# Patient Record
Sex: MALE | Race: WHITE
[De-identification: names, ages, dates, MRNs, and addresses within clinical notes are randomized per-mention and may not be internally consistent; named-entity substitution may affect disease eponyms.]

---

## 2017-08-28 ENCOUNTER — HOSPITAL ENCOUNTER (EMERGENCY)
Dept: HOSPITAL 74 - JER | Age: 52
Discharge: NURSING, REHAB OR LONG-TERM CARE | End: 2017-08-28
Payer: COMMERCIAL

## 2017-08-28 VITALS — BODY MASS INDEX: 29.8 KG/M2

## 2017-08-28 VITALS — SYSTOLIC BLOOD PRESSURE: 133 MMHG | DIASTOLIC BLOOD PRESSURE: 100 MMHG | TEMPERATURE: 98.2 F | HEART RATE: 65 BPM
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
## 2017-08-28 DIAGNOSIS — E03.9: ICD-10-CM

## 2017-08-28 DIAGNOSIS — Z04.8: Primary | ICD-10-CM

## 2017-08-28 DIAGNOSIS — F78: ICD-10-CM

## 2017-08-28 DIAGNOSIS — G80.8: ICD-10-CM

## 2017-08-28 DIAGNOSIS — R05: ICD-10-CM
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
## 2017-08-28 NOTE — PDOC
History of Present Illness





- General


Stated Complaint: FALL (PCP SENT)


Time Seen by Provider: 08/28/17 10:53


History Source: Patient


Exam Limitations: No Limitations





- History of Present Illness


Initial Comments: 


08/28/17 11:16


CHIEF COMPLAINT: Fall





HISTORY OF PRESENT ILLNESS: This is a 51-year-old male with a history of CP, MR

, and hypothyroidism sent by his facility for evaluation following a fall from 

his bed. The patient is able to communicate using an electronic device, and 

states that he slid out of bed on his own has he wanted to use the bathroom and 

he did not want to wait for anyone to help him. He denies head trauma or any 

other injuries. He denies LOC. He denies all complaints.





V/s on arrival are notable for diastolic BP of 100.





REVIEW OF SYSTEMS: Collateral history obtained from aide at bedside


GENERAL/CONSTITUTIONAL: No fever or chills. No weakness. No weight change.


HEAD, EYES, EARS, NOSE AND THROAT: No change in vision. No ear pain or 

discharge. No sore throat.


CARDIOVASCULAR: No chest pain or palpitations.


RESPIRATORY: Persistent cough > 1wk.


GASTROINTESTINAL: No nausea, vomiting, diarrhea or constipation. 


GENITOURINARY: No dysuria, frequency, or change in urination.


MUSCULOSKELETAL: No joint or muscle swelling or pain. No neck or back pain.


SKIN: No rash or easy bruising.


NEUROLOGIC: No headache, vertigo, loss of consciousness, or loss of sensation.


PSYCHIATRIC: No depression or anxiety.


ENDOCRINE: No increased thirst. No abnormal weight change.


HEMATOLOGIC/LYMPHATIC: No anemia, easy bleeding, or history of blood clots.


ALLERGIC/IMMUNOLOGIC: No hives or skin allergy. No latex allergy.





PHYSICAL EXAM:


GENERAL: The patient is awake, alert, and fully oriented, in no acute distress.


HEAD: Normal with no signs of trauma. No cervical spinal tenderness.


ENT: Pupils equal, round and reactive to light, extraocular movements intact, 

sclera anicteric, conjunctiva clear. Neck supple.


LUNGS: Scattered ronchi. Productive cough.


CV: RRR, S1/S2, no MRG. Cap refill < 2 sec.


ABDOMEN: Soft, non-distended, non-tender.


EXTREMITIES: Normal range of motion, no edema.


NEUROLOGICAL: Minimally verbal, non-ambulatory at baseline. CN II-XII grossly 

intact.


PSYCH: Normal mood, normal affect.


SKIN: Warm, dry, normal turgor, no rashes or lesions noted.

















Past History





- Past Medical History


Allergies/Adverse Reactions: 


 Allergies











Allergy/AdvReac Type Severity Reaction Status Date / Time


 


No Known Allergies Allergy   Verified 08/28/17 10:39











Home Medications: 


Ambulatory Orders





Bisacodyl Suppository [Dulcolax Suppository -] 10 mg RC DAILY PRN 08/28/17 


Cetirizine HCl [All Day Allergy] 10 mg PO DAILY 08/28/17 


Chlorhexidine [Chlorhexidine Flavor] 30 ml PO BID 08/28/17 


Fluticasone Prop 0.05% Nasal [Flonase -] 1 - 2 spray NS DAILY 08/28/17 


Ibuprofen 400 mg PO DAILY 08/28/17 


Levothyroxine Sodium [Levo-T] 137 mcg PO DAILY 08/28/17 


Phenol/Glycerin [Chloraseptic Max Spray] 30 ml MM QID 08/28/17 


Trazodone HCl 100 mg PO HS 08/28/17 








Suicide Attempt (Hx): No


Thyroid Disease: Yes (HYPO)


Other medical history: mr cb





- Immunization History


Immunization Up to Date: Yes





- Psycho/Social/Smoking Cessation Hx


Anxiety: No


Suicidal Ideation: No


Smoking History: Never smoked


Have you smoked in the past 12 months: No


Information on smoking cessation initiated: No


Hx Alcohol Use: No


Drug/Substance Use Hx: No


Substance Use Type: None





*Physical Exam





- Vital Signs


 Last Vital Signs











Temp Pulse Resp BP Pulse Ox


 


 98.2 F   65   18   133/100   100 


 


 08/28/17 10:39  08/28/17 10:39  08/28/17 10:39  08/28/17 10:39  08/28/17 10:39














ED Treatment Course





- RADIOLOGY


Radiology Studies Ordered: 














 Category Date Time Status


 


 CHEST X-RAY PORTABLE* [RAD] Stat Radiology  08/28/17 11:05 Ordered














Medical Decision Making





- Medical Decision Making


08/28/17 12:36


A/P: 51 year old male s/p unwitnessed fall. Denies head injury or LOC.





-CXR (productive cough)











*DC/Admit/Observation/Transfer


Diagnosis at time of Disposition: 


 Cough





Fall


Qualifiers:


 Encounter type: initial encounter Qualified Code(s): W19.XXXA - Unspecified 

fall, initial encounter





- Discharge Dispostion


Disposition: HOME


Admit: No





- Patient Instructions


Printed Discharge Instructions:  How to Prevent Falls


Additional Instructions: 


Chace was seen for fall and was determined to have no injuries.


A chest xray was done for productive cough and was clear.


Please return for any concerning symptoms.

## 2018-06-02 ENCOUNTER — HOSPITAL ENCOUNTER (EMERGENCY)
Dept: HOSPITAL 74 - JER | Age: 53
LOS: 1 days | Discharge: HOME | End: 2018-06-03
Payer: COMMERCIAL

## 2018-06-02 VITALS — BODY MASS INDEX: 36.3 KG/M2
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
## 2018-06-02 DIAGNOSIS — G44.309: Primary | ICD-10-CM
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
## 2018-06-02 DIAGNOSIS — S00.01XA: ICD-10-CM
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
## 2018-06-02 DIAGNOSIS — G80.9: ICD-10-CM
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
## 2018-06-02 PROCEDURE — 3E0234Z INTRODUCTION OF SERUM, TOXOID AND VACCINE INTO MUSCLE, PERCUTANEOUS APPROACH: ICD-10-PCS | Performed by: EMERGENCY MEDICINE

## 2018-06-02 NOTE — PDOC
History of Present Illness





- General


Chief Complaint: Injury


Stated Complaint: FALL


Time Seen by Provider: 06/02/18 23:13





- History of Present Illness


Initial Comments: 





06/02/18 23:51


The patient is a 52 year old male with a history of cerebral palsy who presents 

for evaluation following a fall at his group home.  The patient is accompanied 

by his aid who assists in providing the history. They report that the patient 

had a mechanical fall today while in the bathroom with reported head trauma 

prompting the patient's presentation to the ED for further evaluation.  They 

deny LOC and the patient is currently asymptomatic and not complaining of 

headache.  ROS is limited due to the patient's cerebral palsy.





Past History





- Past Medical History


Allergies/Adverse Reactions: 


 Allergies











Allergy/AdvReac Type Severity Reaction Status Date / Time


 


No Known Allergies Allergy   Verified 06/02/18 21:15











COPD: No


Thyroid Disease: Yes


Other medical history: Cerebral Palsy





- Suicide/Smoking/Psychosocial Hx


Smoking History: Never smoked





**Review of Systems





- Review of Systems


Able to Perform ROS?: No (Cerebral Palsy)





*Physical Exam





- Vital Signs


 Last Vital Signs











Temp Pulse Resp BP Pulse Ox


 


 98.2 F   98 H  18   121/91   93 L


 


 06/02/18 21:12  06/02/18 21:12  06/02/18 21:12  06/02/18 21:12  06/02/18 21:12














- Physical Exam


Comments: 





06/02/18 23:59


General Appearance: Nourished. No Apparent Distress


HEENT: EOMI, IDALIA. Abrasions noted to the scalp. No Pharyngeal Erythema, 

Tonsillar Exudate, Tonsillar Erythema


Neck: No Cervical Lymphadenopathy or C-spine Tenderness.  Normal ROM


Respiratory/Chest: Lungs Clear, Normal Breath Sounds. No Crackles, Rales, 

Rhonchi, Wheezing


Cardiovascular: Regular Rhythm, Regular Rate. No  Murmur, Gallops, Rubs


Gastrointestinal/Abdominal: Normal Bowel Sounds, Soft. No Guarding, Rebound, 

Tenderness


Musculoskeletal: No CVA Tenderness


Extremity: Normal Capillary Refill


Integumentary: Normal Color, Dry, Warm


Neurologic:  Alert, Normal Mood/Affect, Normal Response for condition.  At 

Baseline Mentally.








Medical Decision Making





- Medical Decision Making





06/03/18 00:00


The patient is a 52 year old male with a history of cerebral palsy who presents 

for evaluation following a fall at his group home.  Differential includes but 

is not limited to: Intracranial process, Fracture, Contusion.  We obtain a head 

CT to evaluate for any intracranial process and it is unremarkable.  We updated 

the patient's tetanus here in the ED.  The patient appears clinically well on 

exam and is at his baseline mentally.  We are comfortable discharging the 

patient home at this time.  We discussed the results, plan, and return 

precautions with the patient's aid who voiced understanding and is agreeable 

with the plan.





*DC/Admit/Observation/Transfer


Diagnosis at time of Disposition: 


Fall


Qualifiers:


 Encounter type: initial encounter Qualified Code(s): W19.XXXA - Unspecified 

fall, initial encounter








- Discharge Dispostion


Disposition: HOME


Condition at time of disposition: Stable


Decision to Admit order: No





- Referrals





- Patient Instructions


Printed Discharge Instructions:  DI for Post-traumatic Headache, How to Prevent 

Falls


Additional Instructions: 


Please return to the ER if you experience concerning or worsening symptoms 

including worsening pain, headache, or vomiting.





Your CT scan was normal here in the ER.  It is important that you call to 

schedule a follow up appointment with your primary care provider within 2-3 

days to discuss your ER visit and further management of your symptoms.





- Post Discharge Activity

## 2018-06-03 VITALS — TEMPERATURE: 97.9 F | SYSTOLIC BLOOD PRESSURE: 115 MMHG | DIASTOLIC BLOOD PRESSURE: 72 MMHG | HEART RATE: 68 BPM

## 2018-06-03 NOTE — PDOC
Attending Attestation





- Medical Decision Making





06/03/18 00:18


EXAM: HEAD CT WITHOUT CONTRAST


HISTORY: Trauma


COMPARISON: None.


FINDINGS: The ventricular system is midline and nondilated. The sulcal pattern 

is normal for the


patient's age. There is no bleed, mass, extra-axial fluid collection or mass 

effect. No skull fracture


or skull lesion is identified. The visualized paranasal sinuses and mastoid air 

cells are clear.


IMPRESSION: Normal exam.





Read by: Dario Murphy MD





<Melany Montoya - Last Filed: 06/03/18 00:18>





- Resident


Resident Name: Albaro Christianson





- ED Attending Attestation


I have performed the following: I have examined & evaluated the patient, The 

case was reviewed & discussed with the resident, I agree w/resident's findings 

& plan





- HPI


HPI: 





06/03/18 22:37


Pt fell in the NH, and now has scratches to the left side of his head.


Pt has no complaints.  He is his usual self, affable and active.


Pt is MR and no verbal, but he has no complaints.





- Physicial Exam


PE: 





06/03/18 22:36


Agree with resident exam





Pt has no c spine tenderness








<Martine Garcia - Last Filed: 06/03/18 22:38>





Attestations





- Attestations





06/03/18 00:18





Documentation prepared by Melany Montoya, acting as medical scribe for 

Martine Garcia MD.





<Melany Montoya - Last Filed: 06/03/18 00:18>

## 2020-01-15 ENCOUNTER — HOSPITAL ENCOUNTER (EMERGENCY)
Dept: HOSPITAL 74 - JERFT | Age: 55
Discharge: HOME | End: 2020-01-15
Payer: COMMERCIAL

## 2020-01-15 VITALS — DIASTOLIC BLOOD PRESSURE: 61 MMHG | SYSTOLIC BLOOD PRESSURE: 118 MMHG | HEART RATE: 63 BPM | TEMPERATURE: 98.4 F

## 2020-01-15 VITALS — BODY MASS INDEX: 25.8 KG/M2

## 2020-01-15 DIAGNOSIS — J06.9: Primary | ICD-10-CM

## 2020-01-15 DIAGNOSIS — F79: ICD-10-CM

## 2020-01-15 DIAGNOSIS — M19.90: ICD-10-CM

## 2020-01-15 DIAGNOSIS — E03.9: ICD-10-CM

## 2020-01-15 DIAGNOSIS — G80.9: ICD-10-CM

## 2020-01-15 NOTE — EKG
Test Reason : 

Blood Pressure : ***/*** mmHG

Vent. Rate : 063 BPM     Atrial Rate : 063 BPM

   P-R Int : 154 ms          QRS Dur : 074 ms

    QT Int : 366 ms       P-R-T Axes : 025 018 024 degrees

   QTc Int : 374 ms

 

*** POOR DATA QUALITY, INTERPRETATION MAY BE ADVERSELY AFFECTED

NORMAL SINUS RHYTHM WITH SINUS ARRHYTHMIA

NORMAL ECG

WHEN COMPARED WITH ECG OF 29-MAY-2015 18:25,

T WAVE AMPLITUDE HAS DECREASED IN ANTERIOR LEADS

Confirmed by NEDRA PHILLIPS MD ([id number]) on 1/15/2020 3:34:09 PM

 

Referred By:             Confirmed By:NEDRA PHILLIPS MD

## 2020-01-15 NOTE — PDOC
History of Present Illness





- General


Chief Complaint: Cold Symptoms


Stated Complaint: COUGH/ FEVER


Time Seen by Provider: 01/15/20 12:23


History Source: Patient, Other (caretaker)





- History of Present Illness


Timing/Duration: reports: week





Past History





- Past Medical History


Allergies/Adverse Reactions: 


 Allergies











Allergy/AdvReac Type Severity Reaction Status Date / Time


 


No Known Allergies Allergy   Verified 08/28/17 10:39











Home Medications: 


Ambulatory Orders





Bisacodyl Suppository [Dulcolax Suppository -] 10 mg RC DAILY PRN 08/28/17 


Cetirizine HCl [All Day Allergy] 10 mg PO DAILY 08/28/17 


Chlorhexidine [Chlorhexidine Flavor] 30 ml PO BID 08/28/17 


Fluticasone Prop 0.05% Nasal [Flonase -] 1 - 2 spray NS DAILY 08/28/17 


Ibuprofen 400 mg PO DAILY 08/28/17 


Levothyroxine Sodium [Levo-T] 137 mcg PO DAILY 08/28/17 


Phenol/Glycerin [Chloraseptic Max Spray] 30 ml MM QID 08/28/17 


traZODone HCL [Trazodone HCl] 100 mg PO HS 08/28/17 








COPD: No


Thyroid Disease: Yes (HYPO)


Other medical history: cerebral palsy, MR, osteoarthritis, Low Vit D.





- Immunization History


Immunization Up to Date: Yes





- Psycho Social/Smoking Cessation Hx


Smoking History: Never smoked


Have you smoked in the past 12 months: No


Information on smoking cessation initiated: No


Hx Alcohol Use: No


Drug/Substance Use Hx: No


Substance Use Type: None





**Review of Systems





- Review of Systems


Constitutional: No: Fever


Respiratory: Yes: Cough.  No: Shortness of Breath, Wheezing





*Physical Exam





- Vital Signs


 Last Vital Signs











Temp Pulse Resp BP Pulse Ox


 


 98.4 F   63   18   118/61   97 


 


 01/15/20 12:09  01/15/20 12:09  01/15/20 12:09  01/15/20 12:09  01/15/20 12:09














- Physical Exam


General Appearance: Yes: Appropriately Dressed.  No: Apparent Distress


HEENT: positive: Normal Voice


Neck: positive: Supple.  negative: Lymphadenopathy (R), Lymphadenopathy (L)


Respiratory/Chest: positive: Lungs Clear, Normal Breath Sounds.  negative: 

Respiratory Distress


Cardiovascular: positive: Regular Rate, S1, S2


Integumentary: positive: Dry, Warm


Neurologic: positive: Alert





ED Treatment Course





- RADIOLOGY


Radiology Studies Ordered: 














 Category Date Time Status


 


 CHEST PA & LAT [RAD] Stat Radiology  01/15/20 12:44 Ordered














Medical Decision Making





- Medical Decision Making





01/15/20 13:16


54-year-old male, h/o CP, MR, wheelchair-bound, brought in by staff for mostly 

dry cough with pleuritic chest pain.  No hemoptysis, shortness of breath fever 

or chills





see exam





M/l viral URI


Exam unremarkable


EKG and CXR negative here


Dc to continue supportive tx











Discharge





- Discharge Information


Problems reviewed: Yes


Clinical Impression/Diagnosis: 


URI (upper respiratory infection)


Qualifiers:


 URI type: unspecified viral URI Qualified Code(s): J06.9 - Acute upper 

respiratory infection, unspecified





Condition: Stable


Disposition: HOME





- Follow up/Referral





- Patient Discharge Instructions


Patient Printed Discharge Instructions:  DI for Viral Upper Respiratory 

Infection -- Adult


Additional Instructions: 


EKG and chest x-ray are both normal here


The cause of patient's symptoms is most likely a virus.  Continue cough meds 

and maintain adequate hydration


Return to ER as needed





- Post Discharge Activity

## 2022-12-03 ENCOUNTER — HOSPITAL ENCOUNTER (EMERGENCY)
Dept: HOSPITAL 74 - JER | Age: 57
LOS: 1 days | Discharge: HOME | End: 2022-12-04
Payer: COMMERCIAL

## 2022-12-03 VITALS
DIASTOLIC BLOOD PRESSURE: 87 MMHG | RESPIRATION RATE: 20 BRPM | HEART RATE: 89 BPM | TEMPERATURE: 98 F | SYSTOLIC BLOOD PRESSURE: 127 MMHG

## 2022-12-03 VITALS — BODY MASS INDEX: 22.6 KG/M2
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
## 2022-12-03 DIAGNOSIS — S80.212A: Primary | ICD-10-CM

## 2023-07-26 ENCOUNTER — HOSPITAL ENCOUNTER (INPATIENT)
Dept: HOSPITAL 74 - JER | Age: 58
LOS: 5 days | Discharge: HOME | DRG: 389 | End: 2023-07-31
Attending: INTERNAL MEDICINE | Admitting: HOSPITALIST
Payer: COMMERCIAL

## 2023-07-26 VITALS — BODY MASS INDEX: 22.6 KG/M2

## 2023-07-26 DIAGNOSIS — E03.9: ICD-10-CM

## 2023-07-26 DIAGNOSIS — G80.9: ICD-10-CM

## 2023-07-26 DIAGNOSIS — F79: ICD-10-CM

## 2023-07-26 DIAGNOSIS — I31.9: ICD-10-CM

## 2023-07-26 DIAGNOSIS — E87.0: ICD-10-CM

## 2023-07-26 DIAGNOSIS — E87.6: ICD-10-CM

## 2023-07-26 DIAGNOSIS — K56.609: Primary | ICD-10-CM

## 2023-07-26 DIAGNOSIS — M19.231: ICD-10-CM

## 2023-07-27 LAB
ALBUMIN SERPL-MCNC: 3 G/DL (ref 3.4–5)
ALBUMIN SERPL-MCNC: 3.4 G/DL (ref 3.4–5)
ALP SERPL-CCNC: 103 U/L (ref 45–117)
ALP SERPL-CCNC: 123 U/L (ref 45–117)
ALT SERPL-CCNC: 21 U/L (ref 13–61)
ALT SERPL-CCNC: 24 U/L (ref 13–61)
ANION GAP SERPL CALC-SCNC: 7 MMOL/L (ref 8–16)
ANION GAP SERPL CALC-SCNC: 9 MMOL/L (ref 8–16)
APPEARANCE UR: CLEAR
APTT BLD: 25 SECONDS (ref 25.2–36.5)
AST SERPL-CCNC: 16 U/L (ref 15–37)
AST SERPL-CCNC: 22 U/L (ref 15–37)
BASOPHILS # BLD: 0.2 % (ref 0–2)
BILIRUB SERPL-MCNC: 0.2 MG/DL (ref 0.2–1)
BILIRUB SERPL-MCNC: 0.4 MG/DL (ref 0.2–1)
BILIRUB UR STRIP.AUTO-MCNC: NEGATIVE MG/DL
BUN SERPL-MCNC: 13.2 MG/DL (ref 7–18)
BUN SERPL-MCNC: 14.5 MG/DL (ref 7–18)
CALCIUM SERPL-MCNC: 8.8 MG/DL (ref 8.5–10.1)
CALCIUM SERPL-MCNC: 8.9 MG/DL (ref 8.5–10.1)
CHLORIDE SERPL-SCNC: 104 MMOL/L (ref 98–107)
CHLORIDE SERPL-SCNC: 109 MMOL/L (ref 98–107)
CO2 SERPL-SCNC: 26 MMOL/L (ref 21–32)
CO2 SERPL-SCNC: 27 MMOL/L (ref 21–32)
COLOR UR: YELLOW
CREAT SERPL-MCNC: 0.8 MG/DL (ref 0.55–1.3)
CREAT SERPL-MCNC: 0.9 MG/DL (ref 0.55–1.3)
DEPRECATED RDW RBC AUTO: 13 % (ref 11.9–15.9)
DEPRECATED RDW RBC AUTO: 13 % (ref 11.9–15.9)
EOSINOPHIL # BLD: 0.1 % (ref 0–4.5)
ERYTHROCYTE [SEDIMENTATION RATE] IN BLOOD BY WESTERGREN METHOD: 14 MM/HR (ref 0–20)
GLUCOSE SERPL-MCNC: 154 MG/DL (ref 74–106)
GLUCOSE SERPL-MCNC: 96 MG/DL (ref 74–106)
HCT VFR BLD CALC: 38.9 % (ref 35.4–49)
HCT VFR BLD CALC: 42.7 % (ref 35.4–49)
HGB BLD-MCNC: 13.2 GM/DL (ref 11.7–16.9)
HGB BLD-MCNC: 14.8 GM/DL (ref 11.7–16.9)
INR BLD: 1.12 (ref 0.83–1.09)
KETONES UR QL STRIP: (no result)
LACTATE SERPL-MCNC: 2.4 MMOL/L (ref 0.4–2)
LEUKOCYTE ESTERASE UR QL STRIP.AUTO: NEGATIVE
LIPASE SERPL-CCNC: 92 U/L (ref 73–393)
LYMPHOCYTES # BLD: 6 % (ref 8–40)
MAGNESIUM SERPL-MCNC: 2.1 MG/DL (ref 1.8–2.4)
MCH RBC QN AUTO: 28.9 PG (ref 25.7–33.7)
MCH RBC QN AUTO: 29.4 PG (ref 25.7–33.7)
MCHC RBC AUTO-ENTMCNC: 33.8 G/DL (ref 32–35.9)
MCHC RBC AUTO-ENTMCNC: 34.6 G/DL (ref 32–35.9)
MCV RBC: 84.9 FL (ref 80–96)
MCV RBC: 85.5 FL (ref 80–96)
MONOCYTES # BLD AUTO: 8.3 % (ref 3.8–10.2)
NEUTROPHILS # BLD: 85.4 % (ref 42.8–82.8)
NITRITE UR QL STRIP: NEGATIVE
PH UR: 6 [PH] (ref 5–8)
PHOSPHATE SERPL-MCNC: 3 MG/DL (ref 2.5–4.9)
PLATELET # BLD AUTO: 197 10^3/UL (ref 134–434)
PLATELET # BLD AUTO: 231 10^3/UL (ref 134–434)
PMV BLD: 8 FL (ref 7.5–11.1)
PMV BLD: 8.2 FL (ref 7.5–11.1)
POTASSIUM SERPLBLD-SCNC: 3.9 MMOL/L (ref 3.5–5.1)
POTASSIUM SERPLBLD-SCNC: 4.1 MMOL/L (ref 3.5–5.1)
PROT SERPL-MCNC: 6.4 G/DL (ref 6.4–8.2)
PROT SERPL-MCNC: 7.3 G/DL (ref 6.4–8.2)
PROT UR QL STRIP: (no result)
PROT UR QL STRIP: NEGATIVE
PT PNL PPP: 13 SEC (ref 9.7–13)
RBC # BLD AUTO: 4.55 M/MM3 (ref 4–5.6)
RBC # BLD AUTO: 5.03 M/MM3 (ref 4–5.6)
SODIUM SERPL-SCNC: 139 MMOL/L (ref 136–145)
SODIUM SERPL-SCNC: 142 MMOL/L (ref 136–145)
SP GR UR: 1.05 (ref 1.01–1.03)
UROBILINOGEN UR STRIP-MCNC: 0.2 MG/DL (ref 0.2–1)
WBC # BLD AUTO: 10.6 K/MM3 (ref 4–10)
WBC # BLD AUTO: 7.5 K/MM3 (ref 4–10)

## 2023-07-27 PROCEDURE — 0D9670Z DRAINAGE OF STOMACH WITH DRAINAGE DEVICE, VIA NATURAL OR ARTIFICIAL OPENING: ICD-10-PCS | Performed by: INTERNAL MEDICINE

## 2023-07-27 RX ADMIN — PANTOPRAZOLE SODIUM SCH MG: 40 INJECTION, POWDER, FOR SOLUTION INTRAVENOUS at 10:41

## 2023-07-27 RX ADMIN — IBUPROFEN SCH MG: 800 INJECTION INTRAVENOUS at 23:32

## 2023-07-27 RX ADMIN — DEXTROSE AND SODIUM CHLORIDE SCH MLS/HR: 5; 900 INJECTION, SOLUTION INTRAVENOUS at 20:35

## 2023-07-28 LAB
ALBUMIN SERPL-MCNC: 2.9 G/DL (ref 3.4–5)
ALP SERPL-CCNC: 92 U/L (ref 45–117)
ALT SERPL-CCNC: 23 U/L (ref 13–61)
ANION GAP SERPL CALC-SCNC: 5 MMOL/L (ref 8–16)
AST SERPL-CCNC: 31 U/L (ref 15–37)
BASOPHILS # BLD: 0.2 % (ref 0–2)
BILIRUB SERPL-MCNC: 0.2 MG/DL (ref 0.2–1)
BUN SERPL-MCNC: 11.1 MG/DL (ref 7–18)
CALCIUM SERPL-MCNC: 8.7 MG/DL (ref 8.5–10.1)
CHLORIDE SERPL-SCNC: 113 MMOL/L (ref 98–107)
CO2 SERPL-SCNC: 25 MMOL/L (ref 21–32)
CREAT SERPL-MCNC: 0.8 MG/DL (ref 0.55–1.3)
DEPRECATED RDW RBC AUTO: 13.2 % (ref 11.9–15.9)
EOSINOPHIL # BLD: 2.5 % (ref 0–4.5)
GLUCOSE SERPL-MCNC: 97 MG/DL (ref 74–106)
HCT VFR BLD CALC: 37.5 % (ref 35.4–49)
HGB BLD-MCNC: 12.9 GM/DL (ref 11.7–16.9)
LYMPHOCYTES # BLD: 24.6 % (ref 8–40)
MAGNESIUM SERPL-MCNC: 2 MG/DL (ref 1.8–2.4)
MCH RBC QN AUTO: 29.3 PG (ref 25.7–33.7)
MCHC RBC AUTO-ENTMCNC: 34.4 G/DL (ref 32–35.9)
MCV RBC: 85.1 FL (ref 80–96)
MONOCYTES # BLD AUTO: 12.6 % (ref 3.8–10.2)
NEUTROPHILS # BLD: 60.1 % (ref 42.8–82.8)
PLATELET # BLD AUTO: 207 10^3/UL (ref 134–434)
PMV BLD: 7.8 FL (ref 7.5–11.1)
POTASSIUM SERPLBLD-SCNC: 3.8 MMOL/L (ref 3.5–5.1)
PROT SERPL-MCNC: 6 G/DL (ref 6.4–8.2)
RBC # BLD AUTO: 4.41 M/MM3 (ref 4–5.6)
SODIUM SERPL-SCNC: 144 MMOL/L (ref 136–145)
WBC # BLD AUTO: 5.9 K/MM3 (ref 4–10)

## 2023-07-28 RX ADMIN — DEXTROSE AND SODIUM CHLORIDE SCH MLS/HR: 5; 900 INJECTION, SOLUTION INTRAVENOUS at 22:32

## 2023-07-28 RX ADMIN — DEXTROSE AND SODIUM CHLORIDE SCH MLS/HR: 5; 900 INJECTION, SOLUTION INTRAVENOUS at 07:38

## 2023-07-28 RX ADMIN — IBUPROFEN SCH MG: 800 INJECTION INTRAVENOUS at 15:58

## 2023-07-28 RX ADMIN — IBUPROFEN SCH MG: 800 INJECTION INTRAVENOUS at 22:27

## 2023-07-28 RX ADMIN — IBUPROFEN SCH MG: 800 INJECTION INTRAVENOUS at 06:13

## 2023-07-28 RX ADMIN — PANTOPRAZOLE SODIUM SCH MG: 40 INJECTION, POWDER, FOR SOLUTION INTRAVENOUS at 10:47

## 2023-07-29 LAB
ALBUMIN SERPL-MCNC: 2.8 G/DL (ref 3.4–5)
ALP SERPL-CCNC: 82 U/L (ref 45–117)
ALT SERPL-CCNC: 22 U/L (ref 13–61)
ANION GAP SERPL CALC-SCNC: 5 MMOL/L (ref 8–16)
AST SERPL-CCNC: 30 U/L (ref 15–37)
BASOPHILS # BLD: 0.4 % (ref 0–2)
BILIRUB SERPL-MCNC: 0.3 MG/DL (ref 0.2–1)
BUN SERPL-MCNC: 8.9 MG/DL (ref 7–18)
CALCIUM SERPL-MCNC: 8.3 MG/DL (ref 8.5–10.1)
CHLORIDE SERPL-SCNC: 117 MMOL/L (ref 98–107)
CO2 SERPL-SCNC: 26 MMOL/L (ref 21–32)
CREAT SERPL-MCNC: 0.9 MG/DL (ref 0.55–1.3)
DEPRECATED RDW RBC AUTO: 12.3 % (ref 11.9–15.9)
EOSINOPHIL # BLD: 4.6 % (ref 0–4.5)
GLUCOSE SERPL-MCNC: 111 MG/DL (ref 74–106)
HCT VFR BLD CALC: 35.7 % (ref 35.4–49)
HGB BLD-MCNC: 12.1 GM/DL (ref 11.7–16.9)
LYMPHOCYTES # BLD: 30.6 % (ref 8–40)
MAGNESIUM SERPL-MCNC: 1.9 MG/DL (ref 1.8–2.4)
MCH RBC QN AUTO: 29 PG (ref 25.7–33.7)
MCHC RBC AUTO-ENTMCNC: 33.9 G/DL (ref 32–35.9)
MCV RBC: 85.5 FL (ref 80–96)
MONOCYTES # BLD AUTO: 13 % (ref 3.8–10.2)
NEUTROPHILS # BLD: 51.4 % (ref 42.8–82.8)
PLATELET # BLD AUTO: 222 10^3/UL (ref 134–434)
PMV BLD: 8.3 FL (ref 7.5–11.1)
POTASSIUM SERPLBLD-SCNC: 3.5 MMOL/L (ref 3.5–5.1)
PROT SERPL-MCNC: 5.8 G/DL (ref 6.4–8.2)
RBC # BLD AUTO: 4.17 M/MM3 (ref 4–5.6)
SODIUM SERPL-SCNC: 148 MMOL/L (ref 136–145)
WBC # BLD AUTO: 4.6 K/MM3 (ref 4–10)

## 2023-07-29 RX ADMIN — PANTOPRAZOLE SODIUM SCH MG: 40 INJECTION, POWDER, FOR SOLUTION INTRAVENOUS at 09:13

## 2023-07-29 RX ADMIN — IBUPROFEN SCH MG: 800 INJECTION INTRAVENOUS at 22:23

## 2023-07-29 RX ADMIN — IBUPROFEN SCH MG: 800 INJECTION INTRAVENOUS at 15:10

## 2023-07-29 RX ADMIN — IBUPROFEN SCH MG: 800 INJECTION INTRAVENOUS at 06:35

## 2023-07-29 RX ADMIN — DEXTROSE AND SODIUM CHLORIDE SCH MLS/HR: 5; 900 INJECTION, SOLUTION INTRAVENOUS at 17:44

## 2023-07-30 LAB
ALBUMIN SERPL-MCNC: 3 G/DL (ref 3.4–5)
ALP SERPL-CCNC: 84 U/L (ref 45–117)
ALT SERPL-CCNC: 27 U/L (ref 13–61)
ANION GAP SERPL CALC-SCNC: 6 MMOL/L (ref 8–16)
ANION GAP SERPL CALC-SCNC: 6 MMOL/L (ref 8–16)
AST SERPL-CCNC: 30 U/L (ref 15–37)
BASOPHILS # BLD: 0.4 % (ref 0–2)
BILIRUB SERPL-MCNC: 0.3 MG/DL (ref 0.2–1)
BUN SERPL-MCNC: 5.2 MG/DL (ref 7–18)
BUN SERPL-MCNC: 7.3 MG/DL (ref 7–18)
CALCIUM SERPL-MCNC: 8.1 MG/DL (ref 8.5–10.1)
CALCIUM SERPL-MCNC: 8.4 MG/DL (ref 8.5–10.1)
CHLORIDE SERPL-SCNC: 115 MMOL/L (ref 98–107)
CHLORIDE SERPL-SCNC: 117 MMOL/L (ref 98–107)
CO2 SERPL-SCNC: 25 MMOL/L (ref 21–32)
CO2 SERPL-SCNC: 26 MMOL/L (ref 21–32)
CREAT SERPL-MCNC: 0.9 MG/DL (ref 0.55–1.3)
CREAT SERPL-MCNC: 0.9 MG/DL (ref 0.55–1.3)
DEPRECATED RDW RBC AUTO: 12.9 % (ref 11.9–15.9)
EOSINOPHIL # BLD: 4.9 % (ref 0–4.5)
GLUCOSE SERPL-MCNC: 100 MG/DL (ref 74–106)
GLUCOSE SERPL-MCNC: 87 MG/DL (ref 74–106)
HCT VFR BLD CALC: 36.2 % (ref 35.4–49)
HGB BLD-MCNC: 12.9 GM/DL (ref 11.7–16.9)
LYMPHOCYTES # BLD: 32.8 % (ref 8–40)
MCH RBC QN AUTO: 29.9 PG (ref 25.7–33.7)
MCHC RBC AUTO-ENTMCNC: 35.5 G/DL (ref 32–35.9)
MCV RBC: 84.1 FL (ref 80–96)
MONOCYTES # BLD AUTO: 9.6 % (ref 3.8–10.2)
NEUTROPHILS # BLD: 52.3 % (ref 42.8–82.8)
PLATELET # BLD AUTO: 233 10^3/UL (ref 134–434)
PMV BLD: 8.1 FL (ref 7.5–11.1)
POTASSIUM SERPLBLD-SCNC: 3.2 MMOL/L (ref 3.5–5.1)
POTASSIUM SERPLBLD-SCNC: 3.3 MMOL/L (ref 3.5–5.1)
PROT SERPL-MCNC: 6.3 G/DL (ref 6.4–8.2)
RBC # BLD AUTO: 4.31 M/MM3 (ref 4–5.6)
SODIUM SERPL-SCNC: 147 MMOL/L (ref 136–145)
SODIUM SERPL-SCNC: 149 MMOL/L (ref 136–145)
WBC # BLD AUTO: 5.7 K/MM3 (ref 4–10)

## 2023-07-30 RX ADMIN — POTASSIUM CHLORIDE SCH MLS/HR: 7.46 INJECTION, SOLUTION INTRAVENOUS at 13:16

## 2023-07-30 RX ADMIN — POTASSIUM CHLORIDE SCH MLS/HR: 7.46 INJECTION, SOLUTION INTRAVENOUS at 15:03

## 2023-07-30 RX ADMIN — IBUPROFEN SCH MG: 800 INJECTION INTRAVENOUS at 06:06

## 2023-07-30 RX ADMIN — PANTOPRAZOLE SODIUM SCH MG: 40 INJECTION, POWDER, FOR SOLUTION INTRAVENOUS at 08:59

## 2023-07-30 RX ADMIN — IBUPROFEN SCH MG: 800 INJECTION INTRAVENOUS at 17:44

## 2023-07-30 RX ADMIN — IBUPROFEN SCH MG: 800 INJECTION INTRAVENOUS at 22:19

## 2023-07-30 RX ADMIN — ENOXAPARIN SODIUM SCH MG: 40 INJECTION SUBCUTANEOUS at 10:15

## 2023-07-30 RX ADMIN — POTASSIUM CHLORIDE SCH MLS/HR: 7.46 INJECTION, SOLUTION INTRAVENOUS at 16:26

## 2023-07-31 VITALS
DIASTOLIC BLOOD PRESSURE: 65 MMHG | TEMPERATURE: 97.9 F | RESPIRATION RATE: 18 BRPM | HEART RATE: 74 BPM | SYSTOLIC BLOOD PRESSURE: 119 MMHG

## 2023-07-31 RX ADMIN — PANTOPRAZOLE SODIUM SCH MG: 40 INJECTION, POWDER, FOR SOLUTION INTRAVENOUS at 09:04

## 2023-07-31 RX ADMIN — IBUPROFEN SCH MG: 800 INJECTION INTRAVENOUS at 06:12

## 2023-07-31 RX ADMIN — IBUPROFEN SCH: 800 INJECTION INTRAVENOUS at 16:13

## 2023-07-31 RX ADMIN — ENOXAPARIN SODIUM SCH MG: 40 INJECTION SUBCUTANEOUS at 09:03

## 2024-01-07 ENCOUNTER — HOSPITAL ENCOUNTER (EMERGENCY)
Dept: HOSPITAL 74 - JER | Age: 59
Discharge: HOME | End: 2024-01-07
Payer: COMMERCIAL

## 2024-01-07 VITALS — HEART RATE: 114 BPM | RESPIRATION RATE: 20 BRPM | DIASTOLIC BLOOD PRESSURE: 63 MMHG | SYSTOLIC BLOOD PRESSURE: 106 MMHG

## 2024-01-07 VITALS — TEMPERATURE: 98.4 F

## 2024-01-07 VITALS — BODY MASS INDEX: 22.7 KG/M2

## 2024-01-07 DIAGNOSIS — U07.1: ICD-10-CM

## 2024-01-07 DIAGNOSIS — R00.0: Primary | ICD-10-CM

## 2024-01-07 DIAGNOSIS — R05.9: ICD-10-CM

## 2024-01-07 LAB
ALBUMIN SERPL-MCNC: 3.1 G/DL (ref 3.4–5)
ALP SERPL-CCNC: 114 U/L (ref 45–117)
ALT SERPL-CCNC: 18 U/L (ref 13–61)
ANION GAP SERPL CALC-SCNC: 6 MMOL/L (ref 4–13)
APTT BLD: 28.3 SECONDS (ref 25.2–36.5)
AST SERPL-CCNC: 11 U/L (ref 15–37)
BASE EXCESS BLDV CALC-SCNC: 4.1 MMOL/L (ref -2–2)
BASOPHILS # BLD: 0.5 % (ref 0–2)
BILIRUB SERPL-MCNC: 0.3 MG/DL (ref 0.2–1)
BUN SERPL-MCNC: 14 MG/DL (ref 7–18)
CALCIUM SERPL-MCNC: 8.5 MG/DL (ref 8.5–10.1)
CHLORIDE SERPL-SCNC: 106 MMOL/L (ref 98–107)
CO2 SERPL-SCNC: 29 MMOL/L (ref 21–32)
CREAT SERPL-MCNC: 1 MG/DL (ref 0.55–1.3)
DEPRECATED RDW RBC AUTO: 12.9 % (ref 11.9–15.9)
EOSINOPHIL # BLD: 1.2 % (ref 0–4.5)
GLUCOSE SERPL-MCNC: 114 MG/DL (ref 74–106)
HCT VFR BLD CALC: 39.7 % (ref 35.4–49)
HCT VFR BLDV CALC: 41 % (ref 35.4–49)
HGB BLD-MCNC: 13.4 GM/DL (ref 11.7–16.9)
INR BLD: 1.17 (ref 0.83–1.09)
LYMPHOCYTES # BLD: 14.1 % (ref 8–40)
MAGNESIUM SERPL-MCNC: 2 MG/DL (ref 1.8–2.4)
MCH RBC QN AUTO: 29.3 PG (ref 25.7–33.7)
MCHC RBC AUTO-ENTMCNC: 33.8 G/DL (ref 32–35.9)
MCV RBC: 86.8 FL (ref 80–96)
MONOCYTES # BLD AUTO: 13.8 % (ref 3.8–10.2)
NEUTROPHILS # BLD: 70.4 % (ref 42.8–82.8)
PCO2 BLDV: 48.3 MMHG (ref 38–52)
PH BLDV: 7.41 [PH] (ref 7.31–7.41)
PHOSPHATE SERPL-MCNC: 2.5 MG/DL (ref 2.5–4.9)
PLATELET # BLD AUTO: 211 10^3/UL (ref 134–434)
PMV BLD: 7.9 FL (ref 7.5–11.1)
POTASSIUM SERPLBLD-SCNC: 3.9 MMOL/L (ref 3.5–5.1)
PROT SERPL-MCNC: 6.6 G/DL (ref 6.4–8.2)
PT PNL PPP: 13.6 SEC (ref 9.7–13)
RBC # BLD AUTO: 4.57 M/MM3 (ref 4–5.6)
SAO2 % BLDV: 47 % (ref 70–80)
SODIUM SERPL-SCNC: 141 MMOL/L (ref 136–145)
WBC # BLD AUTO: 8.8 K/MM3 (ref 4–10)

## 2024-01-07 PROCEDURE — 3E033NZ INTRODUCTION OF ANALGESICS, HYPNOTICS, SEDATIVES INTO PERIPHERAL VEIN, PERCUTANEOUS APPROACH: ICD-10-PCS

## 2024-05-15 ENCOUNTER — HOSPITAL ENCOUNTER (EMERGENCY)
Dept: HOSPITAL 74 - JER | Age: 59
Discharge: HOME | End: 2024-05-15
Payer: COMMERCIAL

## 2024-05-15 VITALS
SYSTOLIC BLOOD PRESSURE: 103 MMHG | RESPIRATION RATE: 22 BRPM | HEART RATE: 95 BPM | TEMPERATURE: 98.1 F | DIASTOLIC BLOOD PRESSURE: 71 MMHG

## 2024-05-15 VITALS — BODY MASS INDEX: 22.2 KG/M2

## 2024-05-15 DIAGNOSIS — K52.9: Primary | ICD-10-CM

## 2024-05-15 DIAGNOSIS — B34.9: ICD-10-CM

## 2024-05-15 DIAGNOSIS — R11.2: ICD-10-CM

## 2024-05-15 DIAGNOSIS — R10.84: ICD-10-CM

## 2024-05-15 DIAGNOSIS — Z20.822: ICD-10-CM

## 2024-05-15 LAB
ALBUMIN SERPL-MCNC: 3.1 G/DL (ref 3.4–5)
ALBUMIN SERPL-MCNC: 3.5 G/DL (ref 3.4–5)
ALP SERPL-CCNC: 124 U/L (ref 45–117)
ALP SERPL-CCNC: 140 U/L (ref 45–117)
ALT SERPL-CCNC: 26 U/L (ref 13–61)
ALT SERPL-CCNC: 34 U/L (ref 13–61)
ANION GAP SERPL CALC-SCNC: 4 MMOL/L (ref 4–13)
ANION GAP SERPL CALC-SCNC: 6 MMOL/L (ref 4–13)
APPEARANCE UR: CLEAR
APTT BLD: 25.4 SECONDS (ref 25.2–36.5)
AST SERPL-CCNC: 17 U/L (ref 15–37)
AST SERPL-CCNC: 91 U/L (ref 15–37)
BACTERIA # UR AUTO: 36 /UL (ref 0–1359)
BASOPHILS # BLD: 0.2 % (ref 0–2)
BILIRUB SERPL-MCNC: 0.3 MG/DL (ref 0.2–1)
BILIRUB SERPL-MCNC: 0.5 MG/DL (ref 0.2–1)
BILIRUB UR STRIP.AUTO-MCNC: NEGATIVE MG/DL
BUN SERPL-MCNC: 17.2 MG/DL (ref 7–18)
BUN SERPL-MCNC: 20.9 MG/DL (ref 7–18)
CALCIUM SERPL-MCNC: 8.6 MG/DL (ref 8.5–10.1)
CALCIUM SERPL-MCNC: 9 MG/DL (ref 8.5–10.1)
CASTS URNS QL MICRO: 2 /UL (ref 0–3.1)
CHLORIDE SERPL-SCNC: 105 MMOL/L (ref 98–107)
CHLORIDE SERPL-SCNC: 106 MMOL/L (ref 98–107)
CO2 SERPL-SCNC: 24 MMOL/L (ref 21–32)
CO2 SERPL-SCNC: 25 MMOL/L (ref 21–32)
COLOR UR: (no result)
CREAT SERPL-MCNC: 0.8 MG/DL (ref 0.55–1.3)
CREAT SERPL-MCNC: 0.9 MG/DL (ref 0.55–1.3)
DEPRECATED RDW RBC AUTO: 13 % (ref 11.9–15.9)
EOSINOPHIL # BLD: 1.3 % (ref 0–4.5)
EPITH CASTS URNS QL MICRO: 17 /UL (ref 0–25.1)
GLUCOSE SERPL-MCNC: 101 MG/DL (ref 74–106)
GLUCOSE SERPL-MCNC: 104 MG/DL (ref 74–106)
HCT VFR BLD CALC: 48 % (ref 35.4–49)
HGB BLD-MCNC: 16.4 GM/DL (ref 11.7–16.9)
INR BLD: 1.1 (ref 0.83–1.09)
KETONES UR QL STRIP: (no result)
LEUKOCYTE ESTERASE UR QL STRIP.AUTO: (no result)
LYMPHOCYTES # BLD: 3.9 % (ref 8–40)
MAGNESIUM SERPL-MCNC: 2.2 MG/DL (ref 1.8–2.4)
MCH RBC QN AUTO: 29.9 PG (ref 25.7–33.7)
MCHC RBC AUTO-ENTMCNC: 34.2 G/DL (ref 32–35.9)
MCV RBC: 87.2 FL (ref 80–96)
MONOCYTES # BLD AUTO: 7.5 % (ref 3.8–10.2)
NEUTROPHILS # BLD: 87.1 % (ref 42.8–82.8)
NITRITE UR QL STRIP: NEGATIVE
PH UR: 5.5 [PH] (ref 5–8)
PLATELET # BLD AUTO: 225 10^3/UL (ref 134–434)
PMV BLD: 8.2 FL (ref 7.5–11.1)
POTASSIUM SERPLBLD-SCNC: 3.7 MMOL/L (ref 3.5–5.1)
POTASSIUM SERPLBLD-SCNC: 8.7 MMOL/L (ref 3.5–5.1)
PROT SERPL-MCNC: 6.3 G/DL (ref 6.4–8.2)
PROT SERPL-MCNC: 8 G/DL (ref 6.4–8.2)
PROT UR QL STRIP: (no result)
PROT UR QL STRIP: NEGATIVE
PT PNL PPP: 12.4 SEC (ref 9.7–13)
RBC # BLD AUTO: 21 /UL (ref 0–23.9)
RBC # BLD AUTO: 5.5 M/MM3 (ref 4–5.6)
SODIUM SERPL-SCNC: 134 MMOL/L (ref 136–145)
SODIUM SERPL-SCNC: 136 MMOL/L (ref 136–145)
SP GR UR: 1.03 (ref 1.01–1.03)
UROBILINOGEN UR STRIP-MCNC: 0.2 MG/DL (ref 0.2–1)
WBC # BLD AUTO: 7.2 K/MM3 (ref 4–10)
WBC # UR AUTO: 36 /UL (ref 0–25.8)

## 2024-05-15 PROCEDURE — 3E030NZ INTRODUCTION OF ANALGESICS, HYPNOTICS, SEDATIVES INTO PERIPHERAL VEIN, OPEN APPROACH: ICD-10-PCS

## 2024-05-15 PROCEDURE — 3E030GC INTRODUCTION OF OTHER THERAPEUTIC SUBSTANCE INTO PERIPHERAL VEIN, OPEN APPROACH: ICD-10-PCS

## 2024-05-15 RX ADMIN — SODIUM CHLORIDE, POTASSIUM CHLORIDE, SODIUM LACTATE AND CALCIUM CHLORIDE ONE ML: 600; 310; 30; 20 INJECTION, SOLUTION INTRAVENOUS at 14:34

## 2024-05-15 RX ADMIN — SODIUM CHLORIDE, POTASSIUM CHLORIDE, SODIUM LACTATE AND CALCIUM CHLORIDE ONE ML: 600; 310; 30; 20 INJECTION, SOLUTION INTRAVENOUS at 19:21

## 2024-05-15 RX ADMIN — ACETAMINOPHEN ONE MG: 10 INJECTION, SOLUTION INTRAVENOUS at 14:34

## 2024-05-15 RX ADMIN — ONDANSETRON ONE MG: 2 INJECTION INTRAMUSCULAR; INTRAVENOUS at 14:25

## 2024-06-29 ENCOUNTER — HOSPITAL ENCOUNTER (EMERGENCY)
Dept: HOSPITAL 74 - JER | Age: 59
Discharge: HOME | End: 2024-06-29
Payer: COMMERCIAL

## 2024-06-29 VITALS
HEART RATE: 72 BPM | SYSTOLIC BLOOD PRESSURE: 114 MMHG | RESPIRATION RATE: 18 BRPM | DIASTOLIC BLOOD PRESSURE: 66 MMHG | TEMPERATURE: 98.3 F

## 2024-06-29 VITALS — BODY MASS INDEX: 34.4 KG/M2

## 2024-06-29 DIAGNOSIS — M79.662: Primary | ICD-10-CM

## 2024-06-29 RX ADMIN — ACETAMINOPHEN ONE MG: 325 TABLET ORAL at 21:02
